# Patient Record
Sex: FEMALE | Race: WHITE
[De-identification: names, ages, dates, MRNs, and addresses within clinical notes are randomized per-mention and may not be internally consistent; named-entity substitution may affect disease eponyms.]

---

## 2017-03-01 ENCOUNTER — HOSPITAL ENCOUNTER (OUTPATIENT)
Dept: HOSPITAL 45 - X.SURG | Age: 67
Discharge: HOME | End: 2017-03-01
Attending: ORTHOPAEDIC SURGERY
Payer: COMMERCIAL

## 2017-03-01 VITALS — OXYGEN SATURATION: 95 % | DIASTOLIC BLOOD PRESSURE: 84 MMHG | HEART RATE: 70 BPM | SYSTOLIC BLOOD PRESSURE: 154 MMHG

## 2017-03-01 VITALS — TEMPERATURE: 97.52 F

## 2017-03-01 VITALS
HEIGHT: 63.5 IN | HEIGHT: 63.5 IN | BODY MASS INDEX: 43.49 KG/M2 | BODY MASS INDEX: 43.49 KG/M2 | WEIGHT: 248.53 LBS | WEIGHT: 248.53 LBS

## 2017-03-01 DIAGNOSIS — E11.9: ICD-10-CM

## 2017-03-01 DIAGNOSIS — M65.332: Primary | ICD-10-CM

## 2017-03-01 DIAGNOSIS — I10: ICD-10-CM

## 2017-03-01 DIAGNOSIS — E03.9: ICD-10-CM

## 2017-03-01 DIAGNOSIS — I78.0: ICD-10-CM

## 2017-03-01 DIAGNOSIS — J45.909: ICD-10-CM

## 2017-03-01 NOTE — ANESTHESIA PROGRESS NT - [facility]
Anesthesia Post Op Note


Date & Time


Mar 1, 2017 at 08:49





Vital Signs


Pain Intensity:  0





 Vital Signs Past 12 Hours








  Date Time  Temp Pulse Resp B/P Pulse Ox O2 Delivery O2 Flow Rate FiO2


 


3/1/17 08:24 36.4 81 16 161/77 94 Room Air  


 


3/1/17 06:43 37.1 73 18 147/82 95 Room Air  











Notes


Mental Status:  alert / awake / arousable, participated in evaluation


Pt Amnestic to Procedure:  Yes


Nausea / Vomiting:  adequately controlled


Pain:  adequately controlled


Airway Patency, RR, SpO2:  stable & adequate


BP & HR:  stable & adequate


Hydration State:  stable & adequate


Anesthetic Complications:  no major complications apparent

## 2017-03-01 NOTE — OPERATIVE REPORT
DATE OF OPERATION:  03/01/2017

 

SURGEON:  Yasir Hernandez MD

 

ASSISTANT:  PRACHI Sosa

 

PREOPERATIVE DIAGNOSIS:  Left long trigger finger.

 

POSTOPERATIVE DIAGNOSIS:  Same.

 

PROCEDURE PERFORMED:  Left long trigger finger/A1 pulley release.

 

COMPLICATIONS:  None.

 

ESTIMATED BLOOD LOSS:  Minimal.

 

TOURNIQUET TIME:  5 minutes at 250 mmHg.

 

ANESTHESIA:  Local with IV sedation.

 

OPERATIVE INDICATIONS:  The patient is a 66-year-old female diabetic patient

who had a history of carpal tunnel problems as well as trigger finger

problems in the past.  She has undergone bilateral carpal tunnel releases in

the past and had trigger finger release in the past as well.  She developed

pain, discomfort, and locking of her long finger.  She had no interest in

further conservative treatment and elected to proceed with operative

treatment of her trigger finger of the long finger.

 

OPERATIVE PROCEDURE:  The patient was taken to the operating room, identified

and placed on the operating table in supine position.  All contact areas were

appropriately padded.  IV antibiotics provided by anesthesia team.  A left

forearm tourniquet was placed.  Some IV sedation was provided.  8 mL of 50:50

combination of 0.5% Marcaine and 2% lidocaine were then injected in and

around the proposed incision site.  The left hand was then prepped and draped

in usual sterile fashion.  The left arm was elevated and exsanguinated with

Esmarch and tourniquet was placed at 250 mmHg.  A transverse incision was

made in the palm at the base of the long finger in between the proximal and

distal palmar creases.  Blunt dissection was carried through the subcutaneous

tissue directly down the flexor tendon sheath.  The flexor tendon sheath was

cleaned of soft tissues.  The A1 pulley was identified and transected with

the use of scissors and then bluntly spread.  The finger was then taken

through an active and active assisted range of motion.  There was no further

catching or locking.  Attention was then drawn toward closing.

 

The wound was irrigated with copious amounts of normal saline.  The

tourniquet was let down for a tourniquet time of 5 minutes.  Hemostasis was

assured with use of electrocautery.  The skin was then closed with 5-0 nylon

suture in a horizontal mattress fashion.  The hand was then cleaned and dried

and a sterile dressing of Xeroform, 4 x 4, sterile cast padding and Ace

bandage were applied.  The patient was then transferred to the recovery room

in stable condition.  The patient tolerated the procedure well with no

complications.  All needle and sponge counts were correct at the end of the

operation.

 

 

I attest to the content of the Intraoperative Record and any orders documented 
therein. Any exceptions are noted below.

 

 

 

MTDD

## 2017-03-01 NOTE — DISCHARGE INSTRUCTIONS-[facility]
Discharge Instructions


Visit


Reason for Visit:  Acquired Left Long Trigger Finger





Discharge


Discharge Diagnosis / Problem:  left long finger trigger finger





Discharge Goals


Goal(s):  Decrease discomfort, Therapeutic intervention





Medications


Stopped Medications Name(s):  


metformin stopped. last dose on sunday morning.





Activity Recommendations


limited use of left hand





Anesthesia


.





Post Anesthesia Instructions:





If you have had General Anesthesia or IV Sedation:





*  Do not drive today.


*  Resume driving when surgeon permits.


*  Do not make important decisions or sign legal documents today.


*  Call surgeon for:





   1.  Temperature elevations greater than 101 degrees F.


   2.  Uncontrollable pain.


   3.  Excessive bleeding.


   4.  Persistent nausea and vomiting.


   5.  Medication intolerance (nausea, vomiting or rash).





*  For nausea and vomiting use only clear liquids such as: tea, soda, bouillon 

until nausea subsides, then gradually increase diet as tolerated.





*  If you have any concerns or questions, call your surgeon's office.  If 

physician is unavailable and it is an emergency, call 911 or go to the nearest 

emergency room.





.





Instructions / Follow-Up


Instructions / Follow-Up





MEDICATIONS:





*  Resume previous medications unless instructed otherwise by your surgeon.





*  Always take pain medication on a full stomach or with food to avoid upset 

stomach. 





*  Do not drink alcohol or drive while taking narcotics.





*  Ibuprofen or Tylenol may be taken if narcotic not needed.








SPECIAL CARE INSTRUCTIONS:





__ None





__ Keep extremity elevated and iced x 48 hours; apply ice 20-30 


    minutes 8-10 times/day. May remove at night.





__ Sling


    __24 hrs/day


    __ Remove at night





__ Shoulder Immobilizer


    __ 24 hrs/day


    __ Remove at night





_x_ Dressing


    _x_ Maintain until seen in office, may shower with plastic over site


    __ Remove dressings in 24-48 hours and then may shower


    __ Cover incisions with band-aids after showering


    __ Do not remove steri-strips





Call physician if chills or temperature rises above 102 degrees or pain


unrelieved by prescribed pain medications at [phone].


.





follow up 2 weeks





Diet Recommendations


Home Diet:  resume previous diet





Procedures


Procedures Performed:  


Left Long Trigger Finger Release





Pending Studies


Studies pending at discharge:  no





Medical Emergencies








.


Who to Call and When:





Medical Emergencies:  If at any time you feel your situation is an emergency, 

please call 911 immediately.





.





Non-Emergent Contact


Non-Emergency issues call your:  Primary Care Provider, Surgeon





.


.





"Provider Documentation" section prepared by Hilario Leiva.

## 2017-03-01 NOTE — [facility] POST OPERATIVE BRIEF NOTE
Immediate Operative Summary


Operative Date


Mar 1, 2017.





Pre-Operative Diagnosis





Left Long Trigger Finger Release





Post-Operative Diagnosis





same





Procedure(s) Performed





Left Long Trigger Finger Release





Surgeon


Dr. EVANGELINA Hernandez





Assistant Surgeon(s)


Renaldo Leiva PA-C





Estimated Blood Loss


minimal





Findings


Left Long Trigger Finger





Specimens





none





Anesthesia


Local with IV Sedation





Complication(s)


None





Disposition


Recovery Room / PACU

## 2018-02-18 ENCOUNTER — HOSPITAL ENCOUNTER (EMERGENCY)
Dept: HOSPITAL 45 - C.EDB | Age: 68
Discharge: HOME | End: 2018-02-18
Payer: COMMERCIAL

## 2018-02-18 VITALS
WEIGHT: 265 LBS | HEIGHT: 63.5 IN | BODY MASS INDEX: 46.37 KG/M2 | HEIGHT: 63.5 IN | BODY MASS INDEX: 46.37 KG/M2 | WEIGHT: 265 LBS

## 2018-02-18 VITALS — HEART RATE: 85 BPM | SYSTOLIC BLOOD PRESSURE: 176 MMHG | OXYGEN SATURATION: 95 % | DIASTOLIC BLOOD PRESSURE: 99 MMHG

## 2018-02-18 VITALS — TEMPERATURE: 98.06 F

## 2018-02-18 DIAGNOSIS — I10: ICD-10-CM

## 2018-02-18 DIAGNOSIS — Z82.49: ICD-10-CM

## 2018-02-18 DIAGNOSIS — S01.21XA: ICD-10-CM

## 2018-02-18 DIAGNOSIS — F32.9: ICD-10-CM

## 2018-02-18 DIAGNOSIS — S02.2XXA: ICD-10-CM

## 2018-02-18 DIAGNOSIS — S00.31XA: ICD-10-CM

## 2018-02-18 DIAGNOSIS — Z79.84: ICD-10-CM

## 2018-02-18 DIAGNOSIS — K21.9: ICD-10-CM

## 2018-02-18 DIAGNOSIS — S00.81XA: Primary | ICD-10-CM

## 2018-02-18 DIAGNOSIS — E11.43: ICD-10-CM

## 2018-02-18 DIAGNOSIS — Z90.10: ICD-10-CM

## 2018-02-18 DIAGNOSIS — Z83.3: ICD-10-CM

## 2018-02-18 DIAGNOSIS — H26.9: ICD-10-CM
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
## 2018-02-18 DIAGNOSIS — Z98.51: ICD-10-CM

## 2018-02-18 DIAGNOSIS — Z90.49: ICD-10-CM

## 2018-02-18 DIAGNOSIS — Z82.0: ICD-10-CM

## 2018-02-18 DIAGNOSIS — E78.5: ICD-10-CM

## 2018-02-18 DIAGNOSIS — Z79.4: ICD-10-CM

## 2018-02-18 NOTE — EMERGENCY ROOM VISIT NOTE
ED Visit Note


First contact with patient:  17:40


This Patient was discussed with the physician assistant, Chace Garcia PA-C.  

The pertinent historical and physical exam findings were confirmed.  I agree 

with the studies ordered and with the interpretations of these studies.  I 

agree with the disposition and care plan.

## 2018-02-18 NOTE — EMERGENCY ROOM VISIT NOTE
History


First contact with patient:  17:40


Chief Complaint:  FALL


Stated Complaint:  FELL ON NOSE





History of Present Illness


Chief Complaint: "Fell on nose".





This patient is a 67 year old female who presents to the Emergency Department 

via private vehicle accompanied by female for evaluation of their nasal bridge 

laceration. Patient sustained the laceration while attempting to descend a 

grassy area when she tripped, fell landing on her face. They report a minimal 

amount of bleeding initially. They report no loss of consciousness. They deny 

any headache, visual disturbance, nausea, vomiting, or neck pain. Patient rates 

her current discomfort as a 5/10. Patient's Tetanus status is currently up-to-

date.





Review of Systems


A complete 6-point Review of Systems was discussed with the patient, with 

pertinent positives and negatives listed in the History of Present Illness. All 

remaining Review of Systems questions can be considered negative unless 

otherwise specified.





Past Medical/Surgical History


Medical Problems:


(1) Cataract


(2) Depression


(3) Diabetes


(4) Dyslipidemia


(5) Gastroparesis


(6) GERD (gastroesophageal reflux disease)


(7) Hypertension


(8) Sensorineural hearing loss


(9) Sleep apnea


Surgical Problems:


(1) H/O parathyroidectomy


(2) History of appendectomy


(3) History of arthroplasty of right knee


(4) History of carpal tunnel release


(5) History of cholecystectomy


(6) History of mastectomy, subtotal


(7) History of tonsillectomy and adenoidectomy


(8) History of tubal ligation








Family History





Cancer


Diabetes mellitus


Hypertension


Seizures





Social History


Smoking Status:  Never Smoker


Alcohol Use:  none


Drug Use:  none


Marital Status:  


Housing Status:  lives with significant other


Occupation Status:  unemployed





Current/Historical Medications


Scheduled


Brimonidine Tartrate (Alphagan P Oph), 1 DROP OPR BID


Cephalexin Monohydrate (Keflex), 500 MG PO TID


Cholecalciferol (Vitamin D3), 1 CAP PO BID


Citalopram (Citalopram), 20 MG PO HS


Ferrous Sulfate (Ferrous Sulfate), 1 TAB PO BID


Furosemide (Lasix), 0.5 TAB PO QAM


Insulin Glargine (Lantus), 30 UNITS SC HS


Losartan Potassium (Cozaar), 50 MG PO HS


Lovastatin (Mevacor), 20 MG PO HS


Metformin Hcl (Glucophage), 1,000 MG PO BID


Montelukast Sodium (Montelukast Sodium), 10 MG PO HS


Multiple Vitamin (Multivitamin), 1 TAB PO QAM


Omeprazole (Prilosec), 40 MG PO BID


Terazosin Hcl (Hytrin), 10 MG PO HS


Timolol Maleate (Ophth) (Timoptic 0.25% Oph), 1 DROP OPR QAM


Travoprost (Travatan Z), 1 DROPS OPR HS





Scheduled PRN


Amoxicillin (Amoxil), 4 TABS PO UD PRN for DENTAL PROCEDURES


Fluticasone Furoate-Vilanterol (Breo Ellipta), 1 PUFF INH HS PRN for Shortness 

of Breath


Fluticasone Propionate (Nasal) (Flonase Allergy Relief), 2 SPRY HAYLEY DAILY PRN 

for ALLERGIES


Ipratropium-Albuterol (Duoneb), 1 TREATMENT INH Q6H PRN for SOB/Wheezing


Meclizine Hcl (Meclizine Hcl), 1 TAB PO TID PRN for VERTIGO


Meloxicam (Mobic), 15 MG PO DAILY PRN for Muscle Spasms


Ondansetron Hcl (Zofran), 4 MG PO Q4H PRN for Nausea


Tizanidine Hcl (Zanaflex), 4 MG PO Q4-6H PRN for Muscle Spasms


Valacyclovir Hcl (Valtrex), 1,000 MG PO UD PRN for COLD SORES





Physical Exam


Vital Signs











  Date Time  Temp Pulse Resp B/P (MAP) Pulse Ox O2 Delivery O2 Flow Rate FiO2


 


2/18/18 19:25  85 18 176/99 95   


 


2/18/18 17:31 36.7 91 18 202/117 97 Room Air  











Physical Exam


VITAL SIGNS - Vital signs and nursing notes were reviewed.  Hypertensive, I 

believe secondary to situation.


GENERAL -67-year-old female appearing her stated age. Communicates well with 

provider and answers questions appropriately.


SKIN - There is a 2 cm laceration noted to the bridge of the nose in the 

horizontal plane. The edges gape apart with traction. There is no active 

bleeding appreciated. No deep structures including vessels, musculature, or 

bony structures are appreciated.  Abrasions are also noted to the patient's 

distal nose as well as her chin.


HEAD - Normocephalic. No Macdonald's Sign or Raccoon's Eyes. No depressed skull 

fractures palpable.


EYES - PERRL with EOMI bilaterally. Without subconjunctival hemorrhage. 

Palpebral conjunctiva pink and moist with no injection.


EARS - No deformities of external structures noted on gross examination 

bilaterally. No hemotympanum present. No tympanic perforation noted. 


NOSE - Midline and without cyanosis. No epistaxis or clear watery discharge 

noted. Septum midline without deviation. No septal hematoma noted. No overlying 

ecchymosis noted.


MOUTH/OROPHARYNX - Without perioral cyanosis. Tongue midline with equal 

elevation of palate bilaterally. No blood noted in the oropharynx. No tonsillar 

hypertrophy, erythema, or exudates noted. No dental fractures noted.


NECK - FROM assessed. No tenderness to palpation over the cervical spinous 

processes. No cervical paraspinal muscle tenderness noted.


LUNGS - Chest wall symmetric without accessory muscle use, intercostals 

retractions, or central cyanosis. Normal vesicular breath sounds CTA B/L. No 

wheezes, rales, or rhonchi appreciated.


CARDIAC - RRR with S1/S2. No murmur, rubs, or gallops appreciated. 


EXTREMITIES - No gross deformities noted of the extremities. +5/5 strength 

noted in UE/LE bilaterally.


NEUROLOGIC - Cranial nerves II through XII grossly intact. 


PSYCH - A&Ox3 and cooperates fully with examiner. Pt is very pleasant and 

interacts well with examiner.





Medical Decision & Procedures


ER Provider


Diagnostic Interpretation:


HEAD CT NONCONTRAST





CT DOSE: 877.40 mGy.cm





HISTORY:      Fall, head and nose pain





TECHNIQUE: Multiaxial CT images of the head were performed without the use of


intravenous contrast. Automated exposure control was utilized for this study.  A


dose lowering technique was utilized adhering to the principles of ALARA.





Comparison: None.





Findings: The paranasal sinuses and mastoid air cells are clear. The calvarium


and skull base are intact. There is no mass, hematoma, midline shift, acute


infarct. White matter hypodensity is nonspecific but suggestive of microvascular


ischemic change. The ventricles and sulci demonstrate mild age-related


involutional changes. Soft tissue swelling and a small laceration at the nasal


bridge.





Impression:


No acute intracranial abnormality. Soft tissue swelling and a small laceration


at the nasal bridge.











Electronically signed by:  Cas Flores M.D.


2/18/2018 6:33 PM





Dictated Date/Time:  2/18/2018 6:30 PM





MAXILLOFACIAL CT





CT DOSE:    





HISTORY:      Fall, head and nose pain





TECHNIQUE: Multiaxial CT images of the maxillofacial region were performed and


reformatted in the coronal plane without the use of contrast.  A dose lowering


technique was utilized adhering to the principles of ALARA.





COMPARISON:  None.





FINDINGS: The visualized cervical spine, skull base, pterygoid plates, lamina


papyracea, orbital floors, mandible, and zygomatic arches are intact. No


fractures. The orbits are unremarkable. Soft tissue swelling and a small


laceration at the nasal bridge. Nondisplaced fracture the tip of the nasal


bones.





IMPRESSION:  





1. Nondisplaced nasal bone fracture.


2. Soft tissue swelling and a small laceration at the nasal bridge.











Electronically signed by:  Cas Flores M.D.


2/18/2018 6:37 PM





Dictated Date/Time:  2/18/2018 6:34 PM





Medications Administered











 Medications


  (Trade)  Dose


 Ordered  Sig/Sonya


 Route  Start Time


 Stop Time Status Last Admin


Dose Admin


 


 Cephalexin


 Monohydrate


  (Keflex 500MG


 Home Pack)  1 homepack  NOW  STAT


 PO  2/18/18 19:18


 2/18/18 19:19 DC 2/18/18 19:25


1 HOMEPACK











Medical Decision





Patient was seen and evaluated by myself. Patient had no focal neurological 

deficits. Patient's exam is otherwise unremarkable. Patient reports no headaches

, visual disturbances, nausea, vomiting, or over-lethargy.  However, given the 

mechanism of injury I we'll recommend CT scan of the patient's head and face.  

Results as above.  Risks and benefits of performing primary wound closure 

versus no repair were discussed with the patient who verbalizes understanding. 

Verbal consent was obtained prior to performing the procedure.  2 cc of 1% 

buffered lidocaine was used to anesthetize the 2 cm laceration. The wound was 

cleansed and prepped in the typical sterile fashion utilizing normal saline and 

Betadine. The wound was sterilely draped. Once proper anesthetization was 

established, the wound was further examined and demonstrated no deep 

involvement. The wound was copiously irrigated with normal saline and Betadine. 

The wound was closed using 3 simple, 6-0 nylon sutures with the wound edges 

being well approximated.  Patient tolerated the procedure well. No 

complications were met. The wound was cleansed and dressed with a Bacitracin 

dressing. Patient educated on worrisome symptoms for return visit to the 

Emergency Department. Patient discharged to home in good condition.





Because of the fracture underlying I will recommend Keflex.





In the evaluation and treatment of this patient, the following differential 

diagnoses were considered: Concussion, Contrecoup Injury, Brain Tumor, 

Depression, Encephalitis, Hypothyroidism, Meningitis, CVA, TIA, Migraine, 

Cluster Headache, Intracranial Abnormality, Intracranial Hemorrhage, Subdural 

Hematoma, Subarachnoid Hemorrhage, Hydrocephalus.





Impression





 Primary Impression:  


 Fall


 Additional Impressions:  


 Contusion of multiple sites


 Laceration


 Nasal bone fracture





Departure Information


Dispostion


Home / Self-Care





Condition


GOOD





Prescriptions





Cephalexin Monohydrate (Keflex) 500 Mg Cap


500 MG PO TID for 7 Days, #21 CAP


   Prov: Chace Garcia PA-C         2/18/18





Referrals


Miri Faith D.O. (PCP)





Patient Instructions


My [facility]





Additional Instructions











Discharge Instructions:





You have received 3 sutures on your nose. These sutures are NOT dissolvable and 

WILL need to be removed by a health care provider in 6 days. You can return to 

the Emergency Department or contact your Primary Care Provider to have the 

sutures removed.





ENT (ear nose and throat dr) follow up for nose (Dr. rodriguez) please call 

tomorrow for nose fracture





Keflex 500mg every 8 hours to help prevent infection of nose.








Proper wound care is essential for adequate wound healing and infection 

prevention. You can shower and clean the wound with soap and water. Do not 

scour over the wound, pat dry with a towel. Do not submerse the wound (i.e. 

bathe or dish wash) until the sutures have been removed. You can use an 

antibiotic ointment with a dressing over the wound for the next 2-3 days. After 

this time you may leave the wound dry and open to the air. If crust develops 

over the wound you can use a Q-tip to apply a 1:1 peroxide:water solution to 

clean the wound.





Look for signs of infection of the wound including: increased pain, swelling, 

foul discharge, streaking, or increased temperature. If any of these are 

noticed you should return to the Emergency Department for further assessment 

and treatment.





As with any laceration you may have received nerve damage to the surrounding 

tissues. This damage may or may not be permanent.





You should keep the area covered with sunscreen for the first 6 months to 1 

year when at risk for exposure to help minimize scarring. 





You can also use scar reducing creams or Vitamin E oil to help minimize 

scarring.





For pain control, you can use the following over-the-counter medicines (if >13 yo):





- Regular strength (325mg/tab) Tylenol (acetaminophen) 2 tabs every 4-6 hours 

as needed. Do not exceed 12 tablets in a 24 hour period. Avoid taking more than 

3 grams (3000 mg) of Tylenol per day. This includes any other sources of 

acetaminophen you may take on a regular basis.








Return to the emergency department if your symptoms worsen despite treatment 

course outlined above.





Problem Qualifiers

## 2018-02-18 NOTE — DIAGNOSTIC IMAGING REPORT
MAXILLOFACIAL CT



CT DOSE:    



HISTORY:      Fall, head and nose pain



TECHNIQUE: Multiaxial CT images of the maxillofacial region were performed and

reformatted in the coronal plane without the use of contrast.  A dose lowering

technique was utilized adhering to the principles of ALARA.



COMPARISON:  None.



FINDINGS: The visualized cervical spine, skull base, pterygoid plates, lamina

papyracea, orbital floors, mandible, and zygomatic arches are intact. No

fractures. The orbits are unremarkable. Soft tissue swelling and a small

laceration at the nasal bridge. Nondisplaced fracture the tip of the nasal

bones.



IMPRESSION:  



1. Nondisplaced nasal bone fracture.

2. Soft tissue swelling and a small laceration at the nasal bridge.







Electronically signed by:  Cas Flores M.D.

2/18/2018 6:37 PM



Dictated Date/Time:  2/18/2018 6:34 PM

## 2018-02-18 NOTE — DIAGNOSTIC IMAGING REPORT
HEAD CT NONCONTRAST



CT DOSE: 877.40 mGy.cm



HISTORY:      Fall, head and nose pain



TECHNIQUE: Multiaxial CT images of the head were performed without the use of

intravenous contrast. Automated exposure control was utilized for this study.  A

dose lowering technique was utilized adhering to the principles of ALARA.



Comparison: None.



Findings: The paranasal sinuses and mastoid air cells are clear. The calvarium

and skull base are intact. There is no mass, hematoma, midline shift, acute

infarct. White matter hypodensity is nonspecific but suggestive of microvascular

ischemic change. The ventricles and sulci demonstrate mild age-related

involutional changes. Soft tissue swelling and a small laceration at the nasal

bridge.



Impression:

No acute intracranial abnormality. Soft tissue swelling and a small laceration

at the nasal bridge.







Electronically signed by:  Cas Flores M.D.

2/18/2018 6:33 PM



Dictated Date/Time:  2/18/2018 6:30 PM